# Patient Record
Sex: FEMALE | Race: BLACK OR AFRICAN AMERICAN | NOT HISPANIC OR LATINO | Employment: UNEMPLOYED | URBAN - METROPOLITAN AREA
[De-identification: names, ages, dates, MRNs, and addresses within clinical notes are randomized per-mention and may not be internally consistent; named-entity substitution may affect disease eponyms.]

---

## 2017-11-14 ENCOUNTER — ALLSCRIPTS OFFICE VISIT (OUTPATIENT)
Dept: OTHER | Facility: OTHER | Age: 32
End: 2017-11-14

## 2017-11-14 DIAGNOSIS — Z13.220 ENCOUNTER FOR SCREENING FOR LIPOID DISORDERS: ICD-10-CM

## 2017-11-14 DIAGNOSIS — E04.9 NONTOXIC GOITER: ICD-10-CM

## 2017-11-14 DIAGNOSIS — E55.9 VITAMIN D DEFICIENCY: ICD-10-CM

## 2017-11-15 ENCOUNTER — GENERIC CONVERSION - ENCOUNTER (OUTPATIENT)
Dept: OTHER | Facility: OTHER | Age: 32
End: 2017-11-15

## 2017-11-15 NOTE — PROGRESS NOTES
Assessment    1  History of pulmonary embolism (V12 55) (Z86 711)   2  Encounter for preventive health examination (V70 0) (Z00 00)   3  Vitamin D deficiency (268 9) (E55 9)   4  Lipid screening (V77 91) (Z13 220)   5  Enlarged thyroid (240 9) (E04 9)    Plan  Enlarged thyroid    · (1) FREE T3; Status:Active; Requested for:14Nov2017;    · (1) T4, FREE; Status:Active; Requested for:14Nov2017;    · (1) TSH; Status:Active; Requested for:14Nov2017;    · US THYROID; Status:Hold For - Scheduling; Requested for:14Nov2017;   Lipid screening    · (1) CBC/PLT/DIFF; Status:Active; Requested for:14Nov2017;    · (1) COMPREHENSIVE METABOLIC PANEL; Status:Active; Requested for:14Nov2017;    · (1) LIPID PANEL, FASTING; Status:Active; Requested for:14Nov2017;   Lipid screening, Vitamin D deficiency    · (1) VITAMIN D 25-HYDROXY; Status:Active; Requested for:14Nov2017;   Need for influenza vaccination    · Stop: Fluzone Quadrivalent 0 5 ML Intramuscular Suspension PrefilledSyringe    Discussion/Summary  Discussion Summary: We will call you with lab resultsthyroid- Obtain US and thyroid labs  PE- f/b Hematologyto obtain old records  Counseling Documentation With Imm: The patient was counseled regarding  Medication SE Review and Pt Understands Tx: Possible side effects of new medications were reviewed with the patient/guardian today  The treatment plan was reviewed with the patient/guardian  The patient/guardian understands and agrees with the treatment plan   Self Referrals:   Self Referrals: No      Chief Complaint  Chief Complaint Free Text Note Form: Patient is here today to become established and have routine well check and has complaints of joint discomfort in right knee      History of Present Illness  HPI: Here for a Physical exam  She has h/o PE in 2016  No source was found  She was on Eliquis  This was stopped earlier this year  PE- f/b Hematology  hx,t was sent to endocrine for evaluation of goiter   Pt reports that this was many years ago  She had US done and was told she did not have a goiter  No further follow up  Area is larger than it was a few years ago  Review of Systems  Complete-Female:  Constitutional: No fever, no chills, feels well, no tiredness, no recent weight gain or weight loss  Eyes: No complaints of eye pain, no red eyes, no eyesight problems, no discharge, no dry eyes, no itching of eyes  ENT: no complaints of earache, no loss of hearing, no nose bleeds, no nasal discharge, no sore throat, no hoarseness  Cardiovascular: No complaints of slow heart rate, no fast heart rate, no chest pain, no palpitations, no leg claudication, no lower extremity edema  Respiratory: No complaints of shortness of breath, no wheezing, no cough, no SOB on exertion, no orthopnea, no PND  Gastrointestinal: No complaints of abdominal pain, no constipation, no nausea or vomiting, no diarrhea, no bloody stools  Genitourinary: No complaints of dysuria, no incontinence, no pelvic pain, no dysmenorrhea, no vaginal discharge or bleeding  Musculoskeletal: No complaints of arthralgias, no myalgias, no joint swelling or stiffness, no limb pain or swelling  Integumentary: No complaints of skin rash or lesions, no itching, no skin wounds, no breast pain or lump  Neurological: No complaints of headache, no confusion, no convulsions, no numbness, no dizziness or fainting, no tingling, no limb weakness, no difficulty walking  Psychiatric: Not suicidal, no sleep disturbance, no anxiety or depression, no change in personality, no emotional problems  Endocrine: No complaints of proptosis, no hot flashes, no muscle weakness, no deepening of the voice, no feelings of weakness  Hematologic/Lymphatic: No complaints of swollen glands, no swollen glands in the neck, does not bleed easily, does not bruise easily  Active Problems  1  Acute sinusitis (461 9) (J01 90)   2  Amenorrhea (626 0) (N91 2)   3   Depression with anxiety (300 4) (F41 8)   4  Diffuse nontoxic goiter (240 9) (E04 0)   5  Generalized anxiety disorder (300 02) (F41 1)   6  Lyme disease (088 81) (A69 20)   7  Vitamin D deficiency (268 9) (E55 9)    Past Medical History  1  History of pulmonary embolism (V12 55) (Z86 711)  Active Problems And Past Medical History Reviewed: The active problems and past medical history were reviewed and updated today  Surgical History  Surgical History Reviewed: The surgical history was reviewed and updated today  Family History  Mother    1  Family history of Essential Hypertension  Family History Reviewed: The family history was reviewed and updated today  Social History   · Denied: History of Alcohol Use (History)   · Currently works full-time (V49 89) (Z78 9)   · Denied: History of Drug Use   · Marital History - Single  Social History Reviewed: The social history was reviewed and updated today  The social history was reviewed and is unchanged  Current Meds   1  No Reported Medications Recorded    Allergies  1  No Known Drug Allergies  2  No Known Environmental Allergies   3  No Known Food Allergies    Vitals  Vital Signs    Recorded: 37END2305 09:07AM   Temperature 98 1 F   Heart Rate 80   Systolic 319   Diastolic 78   Height 5 ft 2 in   Weight 146 lb 4 00 oz   BMI Calculated 26 75   BSA Calculated 1 67   O2 Saturation 98       Physical Exam   Constitutional  General appearance: No acute distress, well appearing and well nourished  Eyes  Conjunctiva and lids: No swelling, erythema or discharge  Pupils and irises: Equal, round and reactive to light  Ears, Nose, Mouth, and Throat  External inspection of ears and nose: Normal    Otoscopic examination: Tympanic membranes translucent with normal light reflex  Canals patent without erythema  Nasal mucosa, septum, and turbinates: Normal without edema or erythema  Oropharynx: Normal with no erythema, edema, exudate or lesions     Pulmonary  Respiratory effort: No increased work of breathing or signs of respiratory distress  Auscultation of lungs: Clear to auscultation  Cardiovascular  Palpation of heart: Normal PMI, no thrills  Auscultation of heart: Normal rate and rhythm, normal S1 and S2, without murmurs  Examination of extremities for edema and/or varicosities: Normal    Carotid pulses: Normal    Abdomen  Abdomen: Non-tender, no masses  Liver and spleen: No hepatomegaly or splenomegaly  Lymphatic  Palpation of lymph nodes in neck: No lymphadenopathy  Musculoskeletal  Gait and station: Normal    Digits and nails: Normal without clubbing or cyanosis  Inspection/palpation of joints, bones, and muscles: Normal    Skin  Skin and subcutaneous tissue: Normal without rashes or lesions  Neurologic  Cranial nerves: Cranial nerves 2-12 intact  Reflexes: 2+ and symmetric  Sensation: No sensory loss  Psychiatric  Orientation to person, place, and time: Normal    Mood and affect: Normal    Additional Exam:  neck- + thyromegaly, likely goiter          Signatures   Electronically signed by : Rojas Joyce NP; Nov 14 2017  9:44AM EST                       (Author)    Electronically signed by : Nora Lua MD; Nov 14 2017 10:31AM EST                       (Co-author)

## 2017-11-16 LAB
25(OH)D3 SERPL-MCNC: 16 NG/ML (ref 30–100)
A/G RATIO (HISTORICAL): 1.7 (ref 1.2–2.2)
ALBUMIN SERPL BCP-MCNC: 4.5 G/DL (ref 3.5–5.5)
ALP SERPL-CCNC: 73 IU/L (ref 39–117)
ALT SERPL W P-5'-P-CCNC: 10 IU/L (ref 0–32)
AST SERPL W P-5'-P-CCNC: 16 IU/L (ref 0–40)
BASOPHILS # BLD AUTO: 0 %
BASOPHILS # BLD AUTO: 0 X10E3/UL (ref 0–0.2)
BILIRUB SERPL-MCNC: 0.7 MG/DL (ref 0–1.2)
BUN SERPL-MCNC: 8 MG/DL (ref 6–20)
BUN/CREA RATIO (HISTORICAL): 11 (ref 9–23)
CALCIUM SERPL-MCNC: 9.3 MG/DL (ref 8.7–10.2)
CHLORIDE SERPL-SCNC: 101 MMOL/L (ref 96–106)
CHOLEST SERPL-MCNC: 166 MG/DL (ref 100–199)
CO2 SERPL-SCNC: 26 MMOL/L (ref 18–29)
CREAT SERPL-MCNC: 0.72 MG/DL (ref 0.57–1)
DEPRECATED RDW RBC AUTO: 13.4 % (ref 12.3–15.4)
EGFR AFRICAN AMERICAN (HISTORICAL): 126 ML/MIN/1.73
EGFR-AMERICAN CALC (HISTORICAL): 110 ML/MIN/1.73
EOSINOPHIL # BLD AUTO: 0 X10E3/UL (ref 0–0.4)
EOSINOPHIL # BLD AUTO: 1 %
GLUCOSE SERPL-MCNC: 81 MG/DL (ref 65–99)
HCT VFR BLD AUTO: 40.5 % (ref 34–46.6)
HDLC SERPL-MCNC: 67 MG/DL
HGB BLD-MCNC: 13.9 G/DL (ref 11.1–15.9)
IMM.GRANULOCYTES (CD4/8) (HISTORICAL): 0 %
IMM.GRANULOCYTES (CD4/8) (HISTORICAL): 0 X10E3/UL (ref 0–0.1)
LDLC SERPL CALC-MCNC: 90 MG/DL (ref 0–99)
LYMPHOCYTES # BLD AUTO: 2 X10E3/UL (ref 0.7–3.1)
LYMPHOCYTES # BLD AUTO: 40 %
MCH RBC QN AUTO: 30.2 PG (ref 26.6–33)
MCHC RBC AUTO-ENTMCNC: 34.3 G/DL (ref 31.5–35.7)
MCV RBC AUTO: 88 FL (ref 79–97)
MONOCYTES # BLD AUTO: 0.3 X10E3/UL (ref 0.1–0.9)
MONOCYTES (HISTORICAL): 6 %
NEUTROPHILS # BLD AUTO: 2.7 X10E3/UL (ref 1.4–7)
NEUTROPHILS # BLD AUTO: 53 %
PLATELET # BLD AUTO: 172 X10E3/UL (ref 150–379)
POTASSIUM SERPL-SCNC: 4 MMOL/L (ref 3.5–5.2)
RBC (HISTORICAL): 4.6 X10E6/UL (ref 3.77–5.28)
SODIUM SERPL-SCNC: 142 MMOL/L (ref 134–144)
T3FREE SERPL-MCNC: 3.9 PG/ML (ref 2–4.4)
T4 FREE SERPL-MCNC: 1.4 NG/DL (ref 0.82–1.77)
TOT. GLOBULIN, SERUM (HISTORICAL): 2.7 G/DL (ref 1.5–4.5)
TOTAL PROTEIN (HISTORICAL): 7.2 G/DL (ref 6–8.5)
TRIGL SERPL-MCNC: 47 MG/DL (ref 0–149)
TSH SERPL DL<=0.05 MIU/L-ACNC: 2.15 UIU/ML (ref 0.45–4.5)
VLDLC SERPL CALC-MCNC: 9 MG/DL (ref 5–40)
WBC # BLD AUTO: 5.1 X10E3/UL (ref 3.4–10.8)

## 2017-12-27 ENCOUNTER — HOSPITAL ENCOUNTER (OUTPATIENT)
Dept: ULTRASOUND IMAGING | Facility: HOSPITAL | Age: 32
Discharge: HOME | End: 2017-12-30
Payer: COMMERCIAL

## 2017-12-27 DIAGNOSIS — E04.9 NONTOXIC GOITER: ICD-10-CM

## 2017-12-27 PROCEDURE — 76536 US EXAM OF HEAD AND NECK: CPT

## 2018-01-13 VITALS
WEIGHT: 146.25 LBS | TEMPERATURE: 98.1 F | BODY MASS INDEX: 26.91 KG/M2 | DIASTOLIC BLOOD PRESSURE: 78 MMHG | HEART RATE: 80 BPM | HEIGHT: 62 IN | SYSTOLIC BLOOD PRESSURE: 120 MMHG | OXYGEN SATURATION: 98 %

## 2018-01-18 NOTE — PROGRESS NOTES
DEC 22 2016         RE: Toño Stovall                              To: Chantell Mc   SWEETIE TEX    MR#: [id number]   : [address]: [id number]                             Fax: [phone]   (Exam #: [id number])      The LMP of this 33year old,  G5, P3-0-1-3 patient was 2016, giving   her an NICOLE of AUG 20 2017 and a current gestational age of 10 weeks 4 days   by dates  A sonographic examination was performed on DEC 22 2016 using   real time equipment  The ultrasound examination was performed using   abdominal & vaginal techniques  The patient has a BMI of 24 7  Her blood   pressure today was 102/68  A normal gestational sac was documented  The fetal pole was not   visualized  The yolk sac was not seen  INDICATIONS      dating   viability   history of pulmonary embolism      Exam Types      Level I      MEASUREMENTS (* Included In Average GA)      Mean G  Sac      1 0 cm      ADNEXA      The left ovary appeared normal and measured 2 1 x 2 2 x 1 5 cm with a   volume of 3 6 cc  The right ovary appeared normal and measured 3 9 x 2 8 x   2 1 cm with a volume of 12 0 cc  IMPRESSION      Fetus Not Seen   Pregnancy of unknown Alice Nicole      Dear Dr Mala Thomson,      Thank you very much for requesting a consultation was very nice patient   for the indication of viability and anticoagulation management  This is   the patient's fifth pregnancy  She has a history of 3 previous full-term   vaginal deliveries, her most recent in 2016  She has a history of   one termination of pregnancy in   She was diagnosed with a pulmonary   embolism 2016 and was placed on a direct anti-factor X   inhibitor, orally, known as Eloquis  At the time of her diagnosis of a   pulmonary embolism, she was on oral contraceptives  She had no other   significant risk factor  There is no significant family history of   thromboembolic events    She denies the current use of tobacco, alcohol, or   drugs  She recently stopped taking Eloquis and was given a prescription   for Lovenox 60 mg twice a day, 1 mg/kg twice a day  She has no known drug   allergies and her family medical history is otherwise noncontributory  A   review of systems is otherwise negative  On exam, the patient appears   well, in no acute distress, and her abdomen is nontender  Alfred Trotter informs me that she had a complete inherited thrombophilia panel   which was negative  Today's ultrasound findings are consistent with a pregnancy of unknown   viability  There is a small gestational sac present however no yolk sac   or fetal pole could be appreciated  She underwent education on the use of Lovenox by my nurse  I recommend   she continue 1 mg/kg twice daily with anti-factor X A levels drawn every   4-6 weeks, 4 hours after her dose and adjustment of the aforementioned   dose to a goal of 0 7-1 2  Consideration should be given for transition   from low molecular weight heparin to unfractionated heparin at 36 weeks   with either twice or 3 times daily dosing to an adjusted a PTT level of   1 5-2 4 hours after the dose   antiseizure consultation is   recommended  The patient was not scheduled for any further follow-up at the    Center  If you would like further follow-up, please do not hesitate to   refer the patient back  Thank you very much for allowing us to participate in the care of this   very nice patient  Should you have any questions, please do not hesitate   to contact our office  Please note, in addition to the time spent discussing the results of the   ultrasound, I spent approximately 15 minutes of face-to-face time with the   patient, greater than 50% of which was spent in counseling and the   coordination of care for this patient  CARLA Frances M D     Electronically signed 17 13:53

## 2018-03-06 DIAGNOSIS — E55.9 VITAMIN D DEFICIENCY: Primary | ICD-10-CM

## 2018-03-06 RX ORDER — CHOLECALCIFEROL (VITAMIN D3) 1250 MCG
CAPSULE ORAL
Qty: 8 CAPSULE | Refills: 1 | Status: SHIPPED | OUTPATIENT
Start: 2018-03-06 | End: 2019-03-11

## 2018-03-16 DIAGNOSIS — E55.9 VITAMIN D DEFICIENCY: ICD-10-CM

## 2018-08-19 ENCOUNTER — APPOINTMENT (EMERGENCY)
Dept: ULTRASOUND IMAGING | Facility: HOSPITAL | Age: 33
End: 2018-08-19
Payer: COMMERCIAL

## 2018-08-19 ENCOUNTER — HOSPITAL ENCOUNTER (EMERGENCY)
Facility: HOSPITAL | Age: 33
Discharge: HOME | End: 2018-08-19
Attending: EMERGENCY MEDICINE | Admitting: EMERGENCY MEDICINE
Payer: COMMERCIAL

## 2018-08-19 VITALS
TEMPERATURE: 98.8 F | WEIGHT: 142 LBS | OXYGEN SATURATION: 100 % | RESPIRATION RATE: 16 BRPM | DIASTOLIC BLOOD PRESSURE: 65 MMHG | HEART RATE: 81 BPM | SYSTOLIC BLOOD PRESSURE: 116 MMHG | BODY MASS INDEX: 25.97 KG/M2

## 2018-08-19 DIAGNOSIS — O03.4 RETAINED PRODUCTS OF CONCEPTION FOLLOWING ABORTION: Primary | ICD-10-CM

## 2018-08-19 LAB
ABO GROUP BLD: NORMAL
ALBUMIN SERPL BCP-MCNC: 3.5 G/DL (ref 3.5–5)
ALP SERPL-CCNC: 60 U/L (ref 46–116)
ALT SERPL W P-5'-P-CCNC: 15 U/L (ref 12–78)
ANION GAP SERPL CALCULATED.3IONS-SCNC: 8 MMOL/L (ref 4–13)
AST SERPL W P-5'-P-CCNC: 22 U/L (ref 5–45)
B-HCG SERPL-ACNC: 4353 MIU/ML
BASOPHILS # BLD AUTO: 0.03 THOUSANDS/ΜL (ref 0–0.1)
BASOPHILS NFR BLD AUTO: 0 % (ref 0–1)
BILIRUB SERPL-MCNC: 0.6 MG/DL (ref 0.2–1)
BLD GP AB SCN SERPL QL: NEGATIVE
BUN SERPL-MCNC: 6 MG/DL (ref 5–25)
CALCIUM SERPL-MCNC: 8.5 MG/DL (ref 8.3–10.1)
CHLORIDE SERPL-SCNC: 103 MMOL/L (ref 100–108)
CO2 SERPL-SCNC: 27 MMOL/L (ref 21–32)
CREAT SERPL-MCNC: 0.64 MG/DL (ref 0.6–1.3)
EOSINOPHIL # BLD AUTO: 0.02 THOUSAND/ΜL (ref 0–0.61)
EOSINOPHIL NFR BLD AUTO: 0 % (ref 0–6)
ERYTHROCYTE [DISTWIDTH] IN BLOOD BY AUTOMATED COUNT: 12.6 % (ref 11.6–15.1)
GFR SERPL CREATININE-BSD FRML MDRD: 134 ML/MIN/1.73SQ M
GLUCOSE SERPL-MCNC: 98 MG/DL (ref 65–140)
HCT VFR BLD AUTO: 34.2 % (ref 34.8–46.1)
HGB BLD-MCNC: 11.7 G/DL (ref 11.5–15.4)
IMM GRANULOCYTES # BLD AUTO: 0.03 THOUSAND/UL (ref 0–0.2)
IMM GRANULOCYTES NFR BLD AUTO: 0 % (ref 0–2)
LYMPHOCYTES # BLD AUTO: 1.7 THOUSANDS/ΜL (ref 0.6–4.47)
LYMPHOCYTES NFR BLD AUTO: 19 % (ref 14–44)
MCH RBC QN AUTO: 30.2 PG (ref 26.8–34.3)
MCHC RBC AUTO-ENTMCNC: 34.2 G/DL (ref 31.4–37.4)
MCV RBC AUTO: 88 FL (ref 82–98)
MONOCYTES # BLD AUTO: 0.35 THOUSAND/ΜL (ref 0.17–1.22)
MONOCYTES NFR BLD AUTO: 4 % (ref 4–12)
NEUTROPHILS # BLD AUTO: 6.67 THOUSANDS/ΜL (ref 1.85–7.62)
NEUTS SEG NFR BLD AUTO: 77 % (ref 43–75)
NRBC BLD AUTO-RTO: 0 /100 WBCS
PLATELET # BLD AUTO: 151 THOUSANDS/UL (ref 149–390)
PMV BLD AUTO: 11.1 FL (ref 8.9–12.7)
POTASSIUM SERPL-SCNC: 3.4 MMOL/L (ref 3.5–5.3)
PROT SERPL-MCNC: 7.1 G/DL (ref 6.4–8.2)
RBC # BLD AUTO: 3.87 MILLION/UL (ref 3.81–5.12)
RH BLD: POSITIVE
SODIUM SERPL-SCNC: 138 MMOL/L (ref 136–145)
SPECIMEN EXPIRATION DATE: NORMAL
WBC # BLD AUTO: 8.8 THOUSAND/UL (ref 4.31–10.16)

## 2018-08-19 PROCEDURE — 80053 COMPREHEN METABOLIC PANEL: CPT | Performed by: EMERGENCY MEDICINE

## 2018-08-19 PROCEDURE — 99284 EMERGENCY DEPT VISIT MOD MDM: CPT

## 2018-08-19 PROCEDURE — 36415 COLL VENOUS BLD VENIPUNCTURE: CPT | Performed by: EMERGENCY MEDICINE

## 2018-08-19 PROCEDURE — 84702 CHORIONIC GONADOTROPIN TEST: CPT | Performed by: EMERGENCY MEDICINE

## 2018-08-19 PROCEDURE — 86901 BLOOD TYPING SEROLOGIC RH(D): CPT | Performed by: EMERGENCY MEDICINE

## 2018-08-19 PROCEDURE — 86900 BLOOD TYPING SEROLOGIC ABO: CPT | Performed by: EMERGENCY MEDICINE

## 2018-08-19 PROCEDURE — 76830 TRANSVAGINAL US NON-OB: CPT

## 2018-08-19 PROCEDURE — 86850 RBC ANTIBODY SCREEN: CPT | Performed by: EMERGENCY MEDICINE

## 2018-08-19 PROCEDURE — 76856 US EXAM PELVIC COMPLETE: CPT

## 2018-08-19 PROCEDURE — 85025 COMPLETE CBC W/AUTO DIFF WBC: CPT | Performed by: EMERGENCY MEDICINE

## 2018-08-19 NOTE — ED PROVIDER NOTES
History  Chief Complaint   Patient presents with    Vaginal Bleeding     pt states that she had an  about three weeks ago and was spotting after the procedure  states that she has been now bleeding heavily and feels lightheaded     HPI    3 weeks ago, patient had surgical   Some small amount of bleeding since the procedure ended but increased since Wed  Today she is using about 4 pads per hour  The clots are bigger  Some lightheadedness  No f/c/s  No chest pain  Some mild lower abdominal cramping pain  Dr Katelynn Ponce is her OB  Procedure done at [address] [place] [address]  MDM 26 yof, vaginal bleeding, will discuss with ob  The patient (and any family present) verbalized understanding of the discharge instructions and warnings that would necessitate return to the Emergency Department  Gave verbal in addition to written discharge instructions  Specifically highlighted areas of special concern regarding the written and verbal discharge instructions and return precautions  All questions were answered prior to discharge  Prior to Admission Medications   Prescriptions Last Dose Informant Patient Reported? Taking? Cholecalciferol (VITAMIN D3) 04468 units CAPS   No No   Sig: TAKE 1 CAPSULE TWICE A WEEK FOR 4 WEEKS  Facility-Administered Medications: None       Past Medical History:   Diagnosis Date    Pulmonary embolism ([address] )        Past Surgical History:   Procedure Laterality Date    INDUCED          History reviewed  No pertinent family history  I have reviewed and agree with the history as documented  Social History   Substance Use Topics    Smoking status: Never Smoker    Smokeless tobacco: Never Used    Alcohol use Yes      Comment: socially        Review of Systems   Genitourinary: Positive for vaginal bleeding  Negative for difficulty urinating and vaginal pain  Neurological: Positive for light-headedness     All other systems reviewed and are negative  Physical Exam  Physical Exam   Constitutional: She is oriented to person, place, and time  She appears well-developed and well-nourished  HENT:   Head: Normocephalic and atraumatic  Right Ear: External ear normal    Left Ear: External ear normal    Eyes: Conjunctivae and EOM are normal    Neck: Normal range of motion  Neck supple  No JVD present  No tracheal deviation present  Cardiovascular: Normal rate, regular rhythm and normal heart sounds  Pulmonary/Chest: Effort normal  No respiratory distress  She has no wheezes  She has no rales  Abdominal: Soft  Bowel sounds are normal  There is no tenderness  There is no rebound and no guarding  Musculoskeletal: She exhibits no edema or tenderness  Neurological: She is alert and oriented to person, place, and time  Skin: Skin is warm and dry  No rash noted  No erythema  Psychiatric: She has a normal mood and affect  Thought content normal    Nursing note and vitals reviewed        Vital Signs  ED Triage Vitals [08/19/18 1817]   Temperature Pulse Respirations Blood Pressure SpO2   98 8 °F (37 1 °C) 103 16 131/89 100 %      Temp Source Heart Rate Source Patient Position - Orthostatic VS BP Location FiO2 (%)   Oral Monitor Sitting Left arm --      Pain Score       --           Vitals:    08/19/18 1817 08/19/18 2124   BP: 131/89 116/65   Pulse: 103 81   Patient Position - Orthostatic VS: Sitting        Visual Acuity      ED Medications  Medications - No data to display    Diagnostic Studies  Results Reviewed     Procedure Component Value Units Date/Time    Comprehensive metabolic panel [[id number]]  (Abnormal) Collected:  08/19/18 1858    Lab Status:  Final result Specimen:  Blood from Line, Arterial Updated:  08/19/18 1949     Sodium 138 mmol/L      Potassium 3 4 (L) mmol/L      Chloride 103 mmol/L      CO2 27 mmol/L      ANION GAP 8 mmol/L      BUN 6 mg/dL      Creatinine 0 64 mg/dL      Glucose 98 mg/dL      Calcium 8 5 mg/dL AST 22 U/L      ALT 15 U/L      Alkaline Phosphatase 60 U/L      Total Protein 7 1 g/dL      Albumin 3 5 g/dL      Total Bilirubin 0 60 mg/dL      eGFR 134 ml/min/1 73sq m     Narrative:         National Kidney Disease Education Program recommendations are as follows:  GFR calculation is accurate only with a steady state creatinine  Chronic Kidney disease less than 60 ml/min/1 73 sq  meters  Kidney failure less than 15 ml/min/1 73 sq  meters      hCG, quantitative [[id number]]  (Abnormal) Collected:  08/19/18 1858    Lab Status:  Final result Specimen:  Blood from Line, Arterial Updated:  08/19/18 1949     HCG, Quant 4,353 (H) mIU/mL     Narrative:          Expected Ranges:     Approximate               Approximate HCG  Gestation age          Concentration ( mIU/mL)  _____________          ______________________   [facility]                      HCG values  0 2-1                       5-50  1-2                           2-3                         100-5000  3-4                         500-24269  4-5                         1000-50357  5-6                         56481-004335  6-8                         64721-554512  8-12                        96675-030987    CBC and differential [[id number]]  (Abnormal) Collected:  08/19/18 1858    Lab Status:  Final result Specimen:  Blood from Line, Arterial Updated:  08/19/18 1905     WBC 8 80 Thousand/uL      RBC 3 87 Million/uL      Hemoglobin 11 7 g/dL      Hematocrit 34 2 (L) %      MCV 88 fL      MCH 30 2 pg      MCHC 34 2 g/dL      RDW 12 6 %      MPV 11 1 fL      Platelets 399 Thousands/uL      nRBC 0 /100 WBCs      Neutrophils Relative 77 (H) %      Immat GRANS % 0 %      Lymphocytes Relative 19 %      Monocytes Relative 4 %      Eosinophils Relative 0 %      Basophils Relative 0 %      Neutrophils Absolute 6 67 Thousands/µL      Immature Grans Absolute 0 03 Thousand/uL      Lymphocytes Absolute 1 70 Thousands/µL      Monocytes Absolute 0 35 Thousand/µL      Eosinophils Absolute 0 02 Thousand/µL      Basophils Absolute 0 03 Thousands/µL                  US pelvis complete w transvaginal   Final Result by Terrance Segura DO ([id number])       Expanded heterogeneous appearance of the endometrium with likely debris and possibly hemorrhage  Findings are suspicious for retained products of conception  Clinical correlation is recommended  I personally discussed this study with Jonathan Beckford on 2018 at 9:06 PM                                Workstation performed: [id number]                    Procedures  Procedures       Phone Contacts  ED Phone Contact    ED Course  ED Course as of Aug 30 0512   Kimani Shea Aug 19, 2018   193 Vaginal exam with very scant amount of active bleeding from cervix     [facility] with Dr Adan Johnson, will have patient followup in clinic tomorrow  MDM  CritCare Time    Disposition  Final diagnoses:   Retained products of conception following      Time reflects when diagnosis was documented in both MDM as applicable and the Disposition within this note     Time User Action Codes Description Comment    2018  9:28 PM Lisa Harris, [address] [O03 4] Retained products of conception following        ED Disposition     ED Disposition Condition Comment    Discharge  Ravi Lam discharge to home/self care  Condition at discharge: Good        Follow-up Information     Follow up With Specialties Details Why Contact Info    Ob Gyn    Tomorrow as previously scheduled          Discharge Medication List as of 2018  9:33 PM      CONTINUE these medications which have NOT CHANGED    Details   Cholecalciferol (VITAMIN D3) 34716 units CAPS TAKE 1 CAPSULE TWICE A WEEK FOR 4 WEEKS , Normal           No discharge procedures on file      ED Provider  Electronically Signed by           Kerrie Matthews MD  18 2704

## 2018-08-20 NOTE — ED NOTES
D/c reviewed with pt  Pt verbalized understanding and has no further questions at this time  Pt ambulatory off unit with steady gait       Tammy Santana RN  08/19/18 0091

## 2018-08-20 NOTE — DISCHARGE INSTRUCTIONS
You are having vaginal bleeding secondary to retained products after your elective   For now, your blood tests show that your blood levels are good and your heart rate is also good  If you start feel lightheaded, dizzy or your bleeding excessively, over 3 pads an hour, please return to the nearest emergency department  Bleeding and Cramping    What is Normal Bleeding? Bleeding varies from woman to woman  You may bleed for the full two weeks or have little or no bleeding  For many women the bleeding stops and starts for a few days at a time  There may even be spotting until your next period  You may also find that you pass some large blood clots, and mild to moderate, or even heavy cramping  All of this is normal     What is Problem Bleeding? If your bleeding is more than 1 maxi-pad (full, soaked through) in an hour, for two consecutive hours, we can prescribe some medicine for you to control bleeding  If you have many large clots for more than a day, call us  (See Medicine to Control Bleeding below)  Call us if you are bleeding heavily beyond two weeks  Go for your follow up exam whether you are bleeding or not  If you have chosen a Medication  with the  pill Mifeprex, please read the special instructions we gave you, as you may have much heavier bleeding than described above  Medicine to Control Heavy Bleeding    Methergine (Methyl Ergonovine) is a medication that tones up the uterine muscle and slows bleeding  If you are bleeding much heavier than your period, or saturating a pad an hour for two hours, call us  You may have cramping so be sure you have taken some pain medication  Remember, some clots and bleeding are normal  Saturating a pad an hour for two hours or passing many large clots in a few hours? Let us know  Cramps and Pain Medication    Please be sure you have over-the-counter pain medication on hand   We encourage you to take pain medication at the first sign of cramps to control unnecessary pain  You should take either Ibuprofen (like Advil, Motrin) up to 800 mg  (4 -200 mg pills of any over-the-counter Ibuprofen product) every 4- 6 hours  Or take Aleve or Naproxen, two pills every 6-8 hours  Extra Strength Tylenol, 2 pills every 4-6 hours can be added but we do not recommend aspirin

## 2019-03-11 ENCOUNTER — OFFICE VISIT (OUTPATIENT)
Dept: FAMILY MEDICINE CLINIC | Facility: CLINIC | Age: 34
End: 2019-03-11
Payer: COMMERCIAL

## 2019-03-11 VITALS
WEIGHT: 149.8 LBS | HEART RATE: 90 BPM | DIASTOLIC BLOOD PRESSURE: 76 MMHG | HEIGHT: 62 IN | SYSTOLIC BLOOD PRESSURE: 104 MMHG | OXYGEN SATURATION: 98 % | BODY MASS INDEX: 27.57 KG/M2 | TEMPERATURE: 98.5 F

## 2019-03-11 DIAGNOSIS — E55.9 VITAMIN D DEFICIENCY: ICD-10-CM

## 2019-03-11 DIAGNOSIS — Z00.00 WELL ADULT EXAM: Primary | ICD-10-CM

## 2019-03-11 DIAGNOSIS — K21.9 GASTROESOPHAGEAL REFLUX DISEASE, ESOPHAGITIS PRESENCE NOT SPECIFIED: ICD-10-CM

## 2019-03-11 DIAGNOSIS — Z13.220 LIPID SCREENING: ICD-10-CM

## 2019-03-11 PROCEDURE — 99395 PREV VISIT EST AGE 18-39: CPT | Performed by: FAMILY MEDICINE

## 2019-03-11 RX ORDER — ASPIRIN 81 MG/1
81 TABLET, CHEWABLE ORAL DAILY
COMMUNITY

## 2019-03-11 RX ORDER — OMEPRAZOLE 40 MG/1
40 CAPSULE, DELAYED RELEASE ORAL DAILY
COMMUNITY

## 2019-03-11 NOTE — PROGRESS NOTES
Assessment/Plan     Healthy female exam       1  Screening labs  2  Patient Counseling:  --Nutrition: Stressed importance of moderation in sodium/caffeine intake, saturated fat and cholesterol, caloric balance, sufficient intake of fresh fruits, vegetables, fiber, calcium, iron, and 1 mg of folate supplement per day (for females capable of pregnancy)  --Exercise: Stressed the importance of regular exercise  --Substance Abuse: Discussed cessation/primary prevention of tobacco, alcohol, or other drug use; driving or other dangerous activities under the influence; availability of treatment for abuse  --Sexuality: Discussed sexually transmitted diseases, partner selection, use of condoms, avoidance of unintended pregnancy  and contraceptive alternatives  --Injury prevention: Discussed safety belts, safety helmets, smoke detector, smoking near bedding or upholstery  --Dental health: Discussed importance of regular tooth brushing, flossing, and dental visits  --Immunizations reviewed  --Discussed benefits of screening colonoscopy  --After hours service discussed with patient   --Advised her to take Omeprazole 40 mg for 12 weeks then wean to every other day then switch to H2 blocker    3  Discussed the patient's BMI with her  BMI Counseling: Body mass index is 27 4 kg/m²  Discussed the patient's BMI with her  The BMI is above average  BMI counseling and education was provided to the patient  Nutrition recommendations include decreasing overall calorie intake and decreasing soda and/or juice intake  Exercise recommendations include exercising 3-5 times per week  The BMI is above average; BMI management plan is completed  4  Follow up as needed for acute illness  Subjective     Leda Garrett is a 26 y o  female and is here for a comprehensive physical exam  The patient reports no problems  She was started on Omeprazole for GERD by her hematologist (she sees for history of PE)   Just started it last week     Do you take any herbs or supplements that were not prescribed by a doctor? no  Are you taking calcium supplements? no  Are you taking aspirin daily? no     History:  LMP: No LMP recorded  Last pap date: 3 years ago  Abnormal pap? no  3 children    The following portions of the patient's history were reviewed and updated as appropriate:   She  has a past medical history of Pulmonary embolism ([facility] [address] )  She   Patient Active Problem List    Diagnosis Date Noted    Lipid screening 2019    Vitamin D deficiency 2019    Gastroesophageal reflux disease 2019     She  has a past surgical history that includes Induced   Her family history includes Hypertension in her mother  She  reports that she has never smoked  She has never used smokeless tobacco  She reports that she drinks alcohol  She reports that she does not use drugs  Current Outpatient Medications   Medication Sig Dispense Refill    aspirin 81 mg chewable tablet Chew 81 mg daily      omeprazole (PriLOSEC) 40 MG capsule Take 40 mg by mouth daily       No current facility-administered medications for this visit  Current Outpatient Medications on File Prior to Visit   Medication Sig    aspirin 81 mg chewable tablet Chew 81 mg daily    omeprazole (PriLOSEC) 40 MG capsule Take 40 mg by mouth daily    [DISCONTINUED] Cholecalciferol (VITAMIN D3) 39991 units CAPS TAKE 1 CAPSULE TWICE A WEEK FOR 4 WEEKS  No current facility-administered medications on file prior to visit  She has No Known Allergies       Review of Systems  Do you have pain that bothers you in your daily life? no  Constitutional: negative  Eyes: negative  Ears, nose, mouth, throat, and face: negative  Respiratory: negative  Cardiovascular: negative  Gastrointestinal: negative  Genitourinary:negative  Integument/breast: negative  Hematologic/lymphatic: negative  Musculoskeletal:negative  Neurological: negative  Behavioral/Psych: negative  Endocrine: negative  Allergic/Immunologic: negative  Objective     /76   Pulse 90   Temp 98 5 °F (36 9 °C)   Ht 5' 2" (1 575 m)   Wt 67 9 kg (149 lb 12 8 oz)   SpO2 98%   BMI 27 40 kg/m²     General Appearance:    Alert, cooperative, no distress, appears stated age   Head:    Normocephalic, without obvious abnormality, atraumatic   Eyes:    PERRL, conjunctiva/corneas clear, EOM's intact, fundi     benign, both eyes   Ears:    Normal TM's and external ear canals, both ears   Nose:   Nares normal, septum midline, mucosa normal, no drainage    or sinus tenderness   Throat:   Lips, mucosa, and tongue normal; teeth and gums normal   Neck:   Supple, symmetrical, trachea midline, no adenopathy;     thyroid:  no enlargement/tenderness/nodules; no carotid    bruit or JVD   Back:     Symmetric, no curvature, ROM normal, no CVA tenderness   Lungs:     Clear to auscultation bilaterally, respirations unlabored   Chest Wall:    No tenderness or deformity    Heart:    Regular rate and rhythm, S1 and S2 normal, no murmur, rub   or gallop       Abdomen:     Soft, non-tender, bowel sounds active all four quadrants,     no masses, no organomegaly           Extremities:   Extremities normal, atraumatic, no cyanosis or edema       Skin:   Skin color, texture, turgor normal, no rashes or lesions   Lymph nodes:   Cervical, supraclavicular, and axillary nodes normal   Neurologic:   CNII-XII intact, normal strength, sensation and reflexes     throughout